# Patient Record
Sex: MALE | Race: WHITE | URBAN - METROPOLITAN AREA
[De-identification: names, ages, dates, MRNs, and addresses within clinical notes are randomized per-mention and may not be internally consistent; named-entity substitution may affect disease eponyms.]

---

## 2018-09-11 PROBLEM — L60.8 ONYCHOPHOSIS: Status: ACTIVE | Noted: 2018-09-11

## 2018-09-11 PROBLEM — I10 HYPERTENSION: Status: ACTIVE | Noted: 2018-09-11

## 2020-10-14 ENCOUNTER — TELEPHONE (OUTPATIENT)
Dept: PHARMACY | Facility: CLINIC | Age: 70
End: 2020-10-14

## 2020-10-14 NOTE — TELEPHONE ENCOUNTER
CLINICAL PHARMACY: ADHERENCE REVIEW  Identified care gap per [employer]; fills at [address]: ACE/ARB adherence    Last Office Visit: 9/23/20     ASSESSMENT  ACE/ARB ADHERENCE  (2019 Bryan Sung = na%; YTD PDC = 95.7%) Potential Fail Date: 10/18/20; Needs 74 days to be adherent per 9/12/20 claims data. Per [facility] Records   Lisinopril/hctz 20-12.5mg last filled on 6/11/20 for a 90 day supply; Billed through [employer]    BP Readings from Last 3 Encounters:   09/23/20 110/70   06/10/20 120/70   02/12/20 130/80     CrCl cannot be calculated (Patient's most recent lab result is older than the maximum 120 days allowed. ). PLAN  No patient out reach planned at this time. - At 9/23 OV, pt had a 90ds sent to mail order and filled. Will pass measure for 2020    GRETA Ponce, PharmD  [address] [address]  Phone: [phone] option-7      CLINICAL PHARMACY CONSULT: MED RECONCILIATION/REVIEW ADDENDUM    For Pharmacy Admin Tracking Only    PHSO: Yes  Time Spent (min): 175 [facility], PharmD  [address]